# Patient Record
Sex: MALE | Race: WHITE
[De-identification: names, ages, dates, MRNs, and addresses within clinical notes are randomized per-mention and may not be internally consistent; named-entity substitution may affect disease eponyms.]

---

## 2018-07-13 ENCOUNTER — HOSPITAL ENCOUNTER (OUTPATIENT)
Dept: HOSPITAL 96 - M.RTH | Age: 63
End: 2018-07-13
Attending: RADIOLOGY
Payer: MEDICARE

## 2018-07-13 DIAGNOSIS — Z08: Primary | ICD-10-CM

## 2018-07-13 DIAGNOSIS — C79.31: ICD-10-CM

## 2018-07-13 DIAGNOSIS — C34.90: ICD-10-CM

## 2018-07-22 NOTE — CON
[address]  [address]                    CONSULTATION                  
_______________________________________________________________________________
 
Name:       EYAL BAILEY                 Room:                      REG YAMILE SANDOVAL#:  [id number]      Account #:      [id number]  
Admission:  07/13/18     Attend Phys:    Eliel Mac MD    
Discharge:               Date of Birth:  [birth year]  
         Report #: [id number]
                                                                     [id number]  
_______________________________________________________________________________
THIS REPORT FOR:  //name//                      
 
CC: Eliel Yancey
 
DATE OF SERVICE:  07/13/2018
 
 
RADIATION ONCOLOGY CONSULT NOTE
 
REFERRING PHYSICIANS:  Dr. Lynne Wu, Dr. Prasad and Dr. Yancey.
 
[facility] phone is [phone].
 
PRIMARY SITE AND HISTOPATHOLOGY:  The patient has findings consistent with
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
 
HISTORY OF PRESENT ILLNESS:  The patient is a 62-year-old gentleman who had
increasing shortness of breath over the last 3 months.  He then had a chest
x-ray that was abnormal, and he had a CT scan of the chest, which revealed a
right upper lobe lesion with postobstructive pneumonia.  The patient underwent a
biopsy of the lesion involving the upper lobe of the right lung on 06/15/2018,
which revealed a neuroendocrine tumor, and the patient also underwent a PET scan
on 06/08/2018, which revealed a right upper lobe lung mass with also a lesion
involving the left ischial fossa.  The left ischial fossa lesion was biopsied on
06/26/2018 and the pathology revealed a poorly differentiated malignancy
favoring small cell carcinoma of pulmonary origin.  The patient then had a head
CT on 07/10/2018, which revealed a hemorrhagic right thalamic mass compatible
with an intracranial metastasis, so he was referred for consideration of
palliative radiation therapy.  The patient denied having any significant
headaches or nausea or change in his visual acuity.
 
PAST MEDICAL AND PAST SURGICAL HISTORY:  Includes hypertension, gastroesophageal
reflux disease, history of deep venous thrombosis. He has a
defibrillator placed.  He has chronic obstructive pulmonary disease.  He had a
cardiomyopathy in 2009.
 
MEDICATIONS:  Include baby aspirin, amlodipine and bisoprolol.
 
ALLERGIES:  He has no known drug allergies.  HE FELT LIKE HE GOT SHORT OF BREATH
WITH METOPROLOL AND THAT HE HAD A COUGH WITH ACE INHIBITORS.
 
FAMILY HISTORY:  His mother had lung cancer.
 
 
 
 
[place] [address]                    CONSULTATION                  
_______________________________________________________________________________
 
Name:       EYAL BAILEY                 Room:                      REG CLI 
M.R.#:  [id number]      Account #:      [id number]  
Admission:  07/13/18     Attend Phys:    Eliel Mac MD    
Discharge:               Date of Birth:  [birth year]  
         Report #: [id number]
                                                                     [id number]  
_______________________________________________________________________________
SOCIAL HISTORY:  He is retired.  He is a disabled .  He is . 
He has 2 sons and a daughter.  Ethanol: he drinks alcohol on a daily basis,
about 2-3 screw drivers per day.  Cigarettes:  He quit smoking in 1999, he
smoked 1-2 packs per day for 30 years.
 
REVIEW OF SYSTEMS:
GENERAL:  He denied having fevers or chills.
SKIN:  He denied having color changes or itching.
LYMPH NODES:  He denied having enlarged or painful glands.
ENDOCRINE:  He may have had slight decreased appetite.
HEMATOLOGY/IMMUNOLOGY:  He was treated for deep venous thrombosis in 2013.
MUSCULOSKELETAL:  He denied having arthritis or painful swollen joints.
HEAD AND NECK:  He denied having any significant headaches.
RESPIRATORY:  He had a little bit of wheezing, but that was improved after his
first cycle of chemotherapy, which he just recently completed.  He indicated
that today, he completed his first cycle of chemotherapy.
CARDIOVASCULAR:  He has a defibrillator in place.  He denied having any
palpitations.
GASTROINTESTINAL:  He denied having any nausea.
NEUROLOGIC:  He denied having any focal weakness.
 
PHYSICAL EXAMINATION:
VITAL SIGNS:  Height 5 feet 8-1/2 inches, weight 107 pounds, blood pressure
133/79, pulse 67, oxygen saturation 94%, respirations 20.
GENERAL:  The patient was alert and oriented.
EYES:  Pupils were equal, round and reactive to light and accommodation. 
Extraocular movements were intact.
HEAD, EARS, NOSE AND THROAT:  Mouth had no visible lesions.
HEART:  Had a regular rate and rhythm without murmur.
LUNGS: were
clear to auscultation with slightly decreased breath sounds bilaterally.
ABDOMEN:  Nontender.  Spleen is not palpable.  Liver was at the costal margin.
EXTREMITIES:  Had no clubbing, cyanosis or edema.
NEUROLOGIC:  Cranial nerves II to XII
were intact.  Sensation intact.  He has 5/5
strength in his extremities.
 
LABORATORY DATA:  Sodium 124, potassium 4.5, BUN 9, creatinine 0.89.  AST 22,
ALT 21.  Hemoglobin 16.1, white blood cell count 6.8.
 
ASSESSMENT AND PLAN:  He has brain metastasis.  He was offered palliative
radiation therapy to the brain.  The risks, benefits and logistics of palliative
radiation therapy to the brain were explained to the patient in detail.  He gave
his witnessed, informed consent to proceed with palliative radiation therapy to
the brain.  He was given a prescription for dexamethasone.
 
 
 
[place] [address]                    CONSULTATION                  
_______________________________________________________________________________
 
Name:       EYAL BAILEY                 Room:                      REG CLI 
M.R.#:  [id number]      Account #:      [id number]  
Admission:  07/13/18     Attend Phys:    Elile Mac MD    
Discharge:               Date of Birth:  [birth year]  
         Report #: [id number]
                                                                     [id number]  
_______________________________________________________________________________
 
Thank you for this consult.
 
 
 
 
 
 
 
 
 
 
 
 
 
 
 
 
 
 
 
 
 
 
 
 
 
 
 
 
 
 
 
 
 
 
 
 
 
 
 
 
 
 
<ELECTRONICALLY SIGNED>
                                        By:  Eliel Mac MD             
07/22/18     2348
D: 07/13/18 1907_______________________________________
T: 07/13/18 2301Dela Mac MD                /nt